# Patient Record
Sex: MALE | Race: WHITE | Employment: OTHER | URBAN - METROPOLITAN AREA
[De-identification: names, ages, dates, MRNs, and addresses within clinical notes are randomized per-mention and may not be internally consistent; named-entity substitution may affect disease eponyms.]

---

## 2019-09-21 ENCOUNTER — TRANSFERRED RECORDS (OUTPATIENT)
Dept: HEALTH INFORMATION MANAGEMENT | Facility: CLINIC | Age: 84
End: 2019-09-21

## 2019-10-28 ENCOUNTER — TRANSFERRED RECORDS (OUTPATIENT)
Dept: HEALTH INFORMATION MANAGEMENT | Facility: CLINIC | Age: 84
End: 2019-10-28

## 2019-11-04 ENCOUNTER — TRANSFERRED RECORDS (OUTPATIENT)
Dept: HEALTH INFORMATION MANAGEMENT | Facility: CLINIC | Age: 84
End: 2019-11-04

## 2019-11-14 ENCOUNTER — MEDICAL CORRESPONDENCE (OUTPATIENT)
Dept: HEALTH INFORMATION MANAGEMENT | Facility: CLINIC | Age: 84
End: 2019-11-14

## 2019-11-18 ENCOUNTER — TELEPHONE (OUTPATIENT)
Dept: NEUROLOGY | Facility: CLINIC | Age: 84
End: 2019-11-18

## 2019-11-18 NOTE — TELEPHONE ENCOUNTER
[facility] Call Center    Phone Message    May a detailed message be left on voicemail: yes    Reason for Call: Other: Pt calling in needs a new ALS appointment.      Action Taken: Message routed to:  [facility] neuro

## 2019-11-19 ENCOUNTER — TELEPHONE (OUTPATIENT)
Dept: NEUROLOGY | Facility: CLINIC | Age: 84
End: 2019-11-19

## 2019-11-19 NOTE — TELEPHONE ENCOUNTER
Left message for patient to call and register his insurance information, then call clinic nurse to schedule. Contact numbers provided.

## 2019-11-19 NOTE — TELEPHONE ENCOUNTER
[facility] Call [facility]    Phone Message    May a detailed message be left on voicemail: yes    Reason for Call: Other:      pt's PCP calling in to confirm  That the clinic has receved the faxed medical records.          Action Taken: Message routed to:  [facility] Neuro

## 2019-12-17 DIAGNOSIS — G12.21 ALS (AMYOTROPHIC LATERAL SCLEROSIS) (H): Primary | ICD-10-CM

## 2019-12-19 ENCOUNTER — APPOINTMENT (OUTPATIENT)
Dept: LAB | Facility: CLINIC | Age: 84
End: 2019-12-19
Payer: COMMERCIAL

## 2019-12-19 ENCOUNTER — OFFICE VISIT (OUTPATIENT)
Dept: NEUROLOGY | Facility: CLINIC | Age: 84
End: 2019-12-19
Payer: COMMERCIAL

## 2019-12-19 VITALS
DIASTOLIC BLOOD PRESSURE: 60 MMHG | OXYGEN SATURATION: 98 % | HEART RATE: 62 BPM | WEIGHT: 231.7 LBS | SYSTOLIC BLOOD PRESSURE: 92 MMHG

## 2019-12-19 DIAGNOSIS — R79.9 ABNORMAL FINDING OF BLOOD CHEMISTRY, UNSPECIFIED: ICD-10-CM

## 2019-12-19 DIAGNOSIS — G12.21 ALS (AMYOTROPHIC LATERAL SCLEROSIS) (H): ICD-10-CM

## 2019-12-19 DIAGNOSIS — G60.3 IDIOPATHIC PROGRESSIVE POLYNEUROPATHY: Primary | ICD-10-CM

## 2019-12-19 DIAGNOSIS — G60.3 IDIOPATHIC PROGRESSIVE POLYNEUROPATHY: ICD-10-CM

## 2019-12-19 DIAGNOSIS — G31.84 MILD COGNITIVE IMPAIRMENT, SO STATED: ICD-10-CM

## 2019-12-19 LAB
ERYTHROCYTE [SEDIMENTATION RATE] IN BLOOD BY WESTERGREN METHOD: 10 MM/H (ref 0–20)
HBA1C MFR BLD: 5.7 % (ref 0–5.6)
TSH SERPL DL<=0.005 MIU/L-ACNC: 1.51 MU/L (ref 0.4–4)
VIT B12 SERPL-MCNC: 1433 PG/ML (ref 193–986)

## 2019-12-19 RX ORDER — ALLOPURINOL 300 MG/1
300 TABLET ORAL DAILY
COMMUNITY
Start: 2013-04-15

## 2019-12-19 RX ORDER — LOSARTAN POTASSIUM 100 MG/1
100 TABLET ORAL DAILY
COMMUNITY
Start: 2012-01-27

## 2019-12-19 RX ORDER — ATORVASTATIN CALCIUM 40 MG/1
40 TABLET, FILM COATED ORAL DAILY
COMMUNITY
Start: 2019-11-14

## 2019-12-19 RX ORDER — COLCHICINE 0.6 MG/1
0.6 TABLET ORAL EVERY 24 HOURS
COMMUNITY
Start: 2013-04-15

## 2019-12-19 RX ORDER — LANOLIN ALCOHOL/MO/W.PET/CERES
1000 CREAM (GRAM) TOPICAL DAILY
COMMUNITY
Start: 2019-11-14

## 2019-12-19 RX ORDER — CALCITRIOL 0.25 UG/1
0.25 CAPSULE, LIQUID FILLED ORAL EVERY MORNING
COMMUNITY
Start: 2018-10-31

## 2019-12-19 RX ORDER — METOPROLOL SUCCINATE 25 MG/1
25 TABLET, EXTENDED RELEASE ORAL DAILY
COMMUNITY
Start: 2019-11-14

## 2019-12-19 RX ORDER — FUROSEMIDE 40 MG
40 TABLET ORAL 2 TIMES DAILY
COMMUNITY
Start: 2013-04-15

## 2019-12-19 SDOH — HEALTH STABILITY: MENTAL HEALTH: HOW OFTEN DO YOU HAVE A DRINK CONTAINING ALCOHOL?: NEVER

## 2019-12-19 ASSESSMENT — PAIN SCALES - GENERAL: PAINLEVEL: NO PAIN (0)

## 2019-12-19 NOTE — NURSING NOTE
Chief Complaint   Patient presents with     Consult     [facility] - CONSULTATION       Davian Dias, EMT

## 2019-12-19 NOTE — PATIENT INSTRUCTIONS
1. You have a neuropathy (disorder of the nerves). It is not dangerous but we will do some blood tests today to evaluate it and have you see a physical therapist as well.    2. Brain scan - Please call [phone] to schedule in our imaging department.    3. We will evaluate your memory with a functional test called a cognitive performance test.    4. I would like you to see a neurologist who can follow up on all of these issues. Please call [phone] to schedule an appointment with General Neurology.    5. I will speak with Dr Lawrence about today's findings, about prompt vitamin B12 supplementation, blood pressure, and sleep evaluation.    6. It is important that you take your medications consistently and as directed to be sure you are getting correct dosages. I recommend a weekly or monthly pill box organizer.    Please call Stephanie @ [phone] if you have questions.

## 2019-12-19 NOTE — LETTER
2019       RE: Henok Negron  [address]  [facility] [address]     Dear Colleague,    Thank you for referring your patient, Henok Negron, to the [facility] at [facility]. Please see a copy of my visit note below.    Service Date: 2019      MD Mikhail Neumann MD David Lin, MD      RE: Henok Negron   MRN: [id number]   : 1935      Dear Doctors:      I saw Henok Negron in neuromuscular consultation today at the [facility] where he has been referred for evaluation of possible ALS.  Mr. Negron is an 84-year-old man with left leg weakness.  He reports longstanding sequelae of a left knee injury from college football, for which he wore a knee brace for many years.  More recently he has felt that he has less confidence in the use of his left leg, which he describes as weakness at the knee, and his gait has been observed to have changed.  In the fall of this year, after a possible subacute worsening of this, he was hospitalized briefly at [facility].  A peroneal neuropathy was suspected, but the patient left the hospital before appropriate diagnostic studies could be completed.  He was subsequently referred for outpatient Neurology consultation and underwent electrodiagnostic studies which demonstrated evidence of a sensory motor neuropathy as well as widespread electrophysiologic evidence of fasciculations in the left lower limb.  Lumbar and cervical imaging did demonstrate significant spondylosis, but no compelling evidence of root compression.  He is referred for further evaluation of this problem.      Mr. Negron denies perceiving fasciculations or cramping.  He and his 2 children who accompanied him today feel that his leg function improved substantially after wearing an ankle-foot orthosis and that that improvement has been sustained even without the AFO.   He denies significant sphincter or sensory disturbances.  He denies upper extremity weakness or clumsiness.  He does endorse some memory difficulty which is strongly endorsed by his daughter, although neither he nor his children feel that there has been a substantial problem with self-care related to memory.  There is not a clear history of behavioral or mood disorder.  He denies dysarthria or dysphagia.  He has only occasional morning headaches and denies nonrestorative sleep, although his daughter indicates that he has been observed to have nocturnal apneas.      Mr. Negron has 2 living brothers and 2 children.  There is no family history of neuromuscular or neurodegenerative disease.  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  He lives alone, but in a senior living facility where he is very active, both socially and physically.      The patient's past medical history is also notable for hyperlipidemia, hypertension and atrial fibrillation.      MEDICATIONS:  Medications are documented in the electronic medical record.      EXAMINATION:  Vital signs are normal, although systolic blood pressure is only 92.  He did have a brief episode of lightheadedness after exiting the car today, but has had no other episodes of syncope or presyncope.  He reports that his systolic blood pressure is commonly in the 90s and a review of recent blood pressures does indicate a similar blood pressure not long ago in his primary care office and other readings in the low 100s.  He is not tachycardic or hypoxemic.  General physical examination demonstrates unremarkable extremities.  There is medically trivial bilateral peripheral edema.  He does have a history of chronic renal insufficiency as well.      NEUROLOGIC EXAMINATION:  The patient is alert and cooperative.  There is a relative paucity of spontaneous affect.  Blink frequency was not systematically evaluated but does not appear to be strikingly reduced.  There is  no evident bradykinesia or bradyphrenia.  He is oriented to place, month and year, but not to date.  Serial 7s are performed without difficulty.  He recalls 0/3 words after distraction and 1/3 with cues.  Speech and language functions are normal.      Neuromuscular examination is as follows:      Electrodiagnostic studies as noted demonstrate evidence of a sensorimotor axonal polyneuropathy with possible conduction slowing across the fibular head on the left.  The amplitudes are not easily read from a faxed copy so it is not clear whether there was a substantial amplitude decrement across the fibular head, although both proximally and distally the amplitudes appear to be quite low.  Fasciculation potentials were noted in lower limbs.  While there is some evidence of motor unit remodeling, fibrillation potentials are not noted in any muscles of the upper or lower limbs.      I explained the following to Mr. 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  Insofar as his strength is normal, his electrodiagnostic studies demonstrate no fibrillation potentials and he has no upper motor neuron signs I do not feel he has ALS.  I certainly cannot exclude the possibility that he will develop clear signs of ALS or related conditions in the future but do not think that is likely. His clinical findings and gait difficulty may reflect a combination of the polyneuropathy and perhaps transient peroneal neuropathy or radiculopathy.  I also think it is difficult to exclude the possibility of a central nervous system component to his lateralized gait abnormality and, recognizing this as well as his vascular risks and cognitive difficulty, I am obtaining an MRI of the brain.  I had intended an MRA as well but given his renal insufficiency, a  contrast-enhanced MRA may not be feasible.  A time of flight study could be done at a later date if deemed important to do so.     I am concerned about Mr. Askew's cognitive disturbance.  In addition to the aforementioned, he also perseverated several times today in conversation.  I have arranged a cognitive performance test.  I also had him seen by our physical therapist today who recommended followup therapy evaluations for gait and balance.  I am obtaining laboratory studies today relevant to polyneuropathy.      In addition, I see that his methylmalonic acid level performed recently and repeated his vitamin B12 level; it is leydi and represents oral supplementation. JESS was normal. Hemoglobin A1c was 5.7.    Finally, I would be interested in the opinion of his primary care provider and his cardiologist as to whether his blood pressure medication should be reduced somewhat.         I have suggested that Mr. Askew follow up with a general neurologist as there is no indication for him to be seen in the [facility].  Certainly, should his condition change or progress, I would be happy to see him at any time in the future.      Sincerely,       Jonatan García MD      cc:   Zoya Gill MD   [facility]      Mikhail Lawrence MD   Primary Care Physician      Jonatan Dowd MD   [facility]    [address]  [address]   [place] [address]       D: 2019   T: 2019   MT: AKA      Name:     KITTY ASKEW   MRN:      [id number]        Account:      [id number]   :      1935           Service Date: 2019      Document: [id number]

## 2019-12-20 LAB
IGA SERPL-MCNC: 231 MG/DL (ref 84–499)
IGG SERPL-MCNC: 978 MG/DL (ref 610–1616)
IGM SERPL-MCNC: 162 MG/DL (ref 35–242)
PROT PATTERN SERPL IFE-IMP: NORMAL

## 2019-12-23 ENCOUNTER — TELEPHONE (OUTPATIENT)
Dept: NEUROLOGY | Facility: CLINIC | Age: 84
End: 2019-12-23

## 2019-12-23 NOTE — PROGRESS NOTES
Service Date: 2019      MD Mikhail Neumann MD David Lin, MD      RE: Henok Negron   MRN: [id number]   : 1935      Dear Doctors:      I saw Henok Negron in neuromuscular consultation today at the [facility] where he has been referred for evaluation of possible ALS.  Mr. Negron is an 84-year-old man with left leg weakness.  He reports longstanding sequelae of a left knee injury from college football, for which he wore a knee brace for many years.  More recently he has felt that he has less confidence in the use of his left leg, which he describes as weakness at the knee, and his gait has been observed to have changed.  In the fall of this year, after a possible subacute worsening of this, he was hospitalized briefly at [facility].  A peroneal neuropathy was suspected, but the patient left the hospital before appropriate diagnostic studies could be completed.  He was subsequently referred for outpatient Neurology consultation and underwent electrodiagnostic studies which demonstrated evidence of a sensory motor neuropathy as well as widespread electrophysiologic evidence of fasciculations in the left lower limb.  Lumbar and cervical imaging did demonstrate significant spondylosis, but no compelling evidence of root compression.  He is referred for further evaluation of this problem.      Mr. Negron denies perceiving fasciculations or cramping.  He and his 2 children who accompanied him today feel that his leg function improved substantially after wearing an ankle-foot orthosis and that that improvement has been sustained even without the AFO.  He denies significant sphincter or sensory disturbances.  He denies upper extremity weakness or clumsiness.  He does endorse some memory difficulty which is strongly endorsed by his daughter, although neither he nor his children feel that there has been a substantial problem with  self-care related to memory.  There is not a clear history of behavioral or mood disorder.  He denies dysarthria or dysphagia.  He has only occasional morning headaches and denies nonrestorative sleep, although his daughter indicates that he has been observed to have nocturnal apneas.      Mr. Negron has 2 living brothers and 2 children.  There is no family history of neuromuscular or neurodegenerative disease.  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  He lives alone, but in a senior living facility where he is very active, both socially and physically.      The patient's past medical history is also notable for hyperlipidemia, hypertension and atrial fibrillation.      MEDICATIONS:  Medications are documented in the electronic medical record.      EXAMINATION:  Vital signs are normal, although systolic blood pressure is only 92.  He did have a brief episode of lightheadedness after exiting the car today, but has had no other episodes of syncope or presyncope.  He reports that his systolic blood pressure is commonly in the 90s and a review of recent blood pressures does indicate a similar blood pressure not long ago in his primary care office and other readings in the low 100s.  He is not tachycardic or hypoxemic.  General physical examination demonstrates unremarkable extremities.  There is medically trivial bilateral peripheral edema.  He does have a history of chronic renal insufficiency as well.      NEUROLOGIC EXAMINATION:  The patient is alert and cooperative.  There is a relative paucity of spontaneous affect.  Blink frequency was not systematically evaluated but does not appear to be strikingly reduced.  There is no evident bradykinesia or bradyphrenia.  He is oriented to place, month and year, but not to date.  Serial 7s are performed without difficulty.  He recalls 0/3 words after distraction and 1/3 with cues.  Speech and language functions are normal.      Neuromuscular examination is  as follows:      Electrodiagnostic studies as noted demonstrate evidence of a sensorimotor axonal polyneuropathy with possible conduction slowing across the fibular head on the left.  The amplitudes are not easily read from a faxed copy so it is not clear whether there was a substantial amplitude decrement across the fibular head, although both proximally and distally the amplitudes appear to be quite low.  Fasciculation potentials were noted in lower limbs.  While there is some evidence of motor unit remodeling, fibrillation potentials are not noted in any muscles of the upper or lower limbs.      I explained the following to Mr. 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  Insofar as his strength is normal, his electrodiagnostic studies demonstrate no fibrillation potentials and he has no upper motor neuron signs I do not feel he has ALS.  I certainly cannot exclude the possibility that he will develop clear signs of ALS or related conditions in the future but do not think that is likely. His clinical findings and gait difficulty may reflect a combination of the polyneuropathy and perhaps transient peroneal neuropathy or radiculopathy.  I also think it is difficult to exclude the possibility of a central nervous system component to his lateralized gait abnormality and, recognizing this as well as his vascular risks and cognitive difficulty, I am obtaining an MRI of the brain.  I had intended an MRA as well but given his renal insufficiency, a contrast-enhanced MRA may not be feasible.  A time of flight study could be done at a later date if deemed important to do so.     I am concerned about Mr. Negron's cognitive disturbance.  In addition to the aforementioned, he also perseverated several times today in conversation.  I have  arranged a cognitive performance test.  I also had him seen by our physical therapist today who recommended followup therapy evaluations for gait and balance.  I am obtaining laboratory studies today relevant to polyneuropathy.      In addition, I see that his methylmalonic acid level performed recently and repeated his vitamin B12 level; it is leydi and represents oral supplementation. JESS was normal. Hemoglobin A1c was 5.7.    Finally, I would be interested in the opinion of his primary care provider and his cardiologist as to whether his blood pressure medication should be reduced somewhat.         I have suggested that Mr. Askew follow up with a general neurologist as there is no indication for him to be seen in the [facility].  Certainly, should his condition change or progress, I would be happy to see him at any time in the future.      Sincerely,       Cherry Chirinos MD      cc:   Zoya Gill MD   [facility]      Mikhail Lawrence MD   Primary Care Physician      Cherry Dowd MD   [facility]    [address] [place] [id number]         CHERRY CHIRINOS MD             D: 2019   T: 2019   MT: AKA      Name:     KITTY ASKEW   MRN:      [id number]        Account:      [id number]   :      1935           Service Date: 2019      Document: [id number]

## 2019-12-24 LAB — METHYLMALONATE SERPL-SCNC: 0.35 UMOL/L (ref 0–0.4)

## 2019-12-26 DIAGNOSIS — G60.3 IDIOPATHIC PROGRESSIVE POLYNEUROPATHY: Primary | ICD-10-CM

## 2020-01-13 ENCOUNTER — HOSPITAL ENCOUNTER (OUTPATIENT)
Dept: OCCUPATIONAL THERAPY | Facility: CLINIC | Age: 85
Setting detail: THERAPIES SERIES
End: 2020-01-13
Attending: PSYCHIATRY & NEUROLOGY
Payer: COMMERCIAL

## 2020-01-13 DIAGNOSIS — G31.84 MILD COGNITIVE IMPAIRMENT, SO STATED: ICD-10-CM

## 2020-01-13 DIAGNOSIS — G60.3 IDIOPATHIC PROGRESSIVE POLYNEUROPATHY: ICD-10-CM

## 2020-01-13 PROCEDURE — 97165 OT EVAL LOW COMPLEX 30 MIN: CPT | Mod: GO | Performed by: REHABILITATION PRACTITIONER

## 2020-01-13 PROCEDURE — 96125 COGNITIVE TEST BY HC PRO: CPT | Mod: GO | Performed by: REHABILITATION PRACTITIONER

## 2020-01-13 PROCEDURE — 97535 SELF CARE MNGMENT TRAINING: CPT | Mod: GO | Performed by: REHABILITATION PRACTITIONER

## 2020-01-13 NOTE — PROGRESS NOTES
Cognitive Performance Test    SUMMARY OF TEST:    The Cognitive Performance Test (CPT) is a standardized performance-based assessment to measure working memory/executive function processing capacities that underlie functional performance. Subtasks include common basic and instrumental activities of daily living (ADL/IADL) which are rated based on the manner in which patients respond to task demands of varying complexity. The total CPT score describes a level of functioning that indicates how information is processed, implications for functional activities, potential safety risks and a recommended level of supervision or assist based on cognitive function. The highest total score on this test is in the range of 5.6 to 5.8.    DATE OF TESTIN2020    RESULTS OF TESTING:                                                                                         CPT Subtest Results    MEDBOX: 4.5/6 SHOP/GLOVES: 4./6 PHONE:    WASH:   TRAVEL:  TOAST:    DRESS:    TOTAL CPT SCORE:  30/34     Average CPT Score  5.0/5.6    INTERPRETATION OF TEST RESULTS:    Based on the Cognitive Performance Test, this patient scored at CPT Level 5.0.  See CPT Levels reference below.    Summary of functional cognitive status:   Braydon was cooperative during the session today. He reported that he thought today's appointment was to address his leg.  When he was informed that it was for the CPT, he denied any difficulty with recall.  During the test, Braydon was observed initiating the task before all of the instructions were provided, was mildly impulsive and didn't look at all items (belts) before making a decision.  He had difficulty with completing the med task and was able to get 50% accurate with specific cues.  Braydon does demonstrate good problem solving skills during pathfinding and phone task.  Wash task not scored as he used soap by sink vs soap provided in box.  At the end of the session  Braydon perseverated on the belts  "having 2 different price tags.  This upset him and informed therapist that he will remember this for next time.  Pt was assured that the intention was not to \"trick\" but to provide different visual cues.      Factors affecting performance:  New or complex medical issue    Recommendations:    Assist for ADL/IADL:  Managing NEW medications, complex meal planning, Cleaning   Supervision for ADL/IADL:  Laundry, Finances, Driving and Medication management  Supervision in living setting:  Weekly checks                                                       Other Recommendations:  Recommend Braydon utilize 2 pill boxes (am and pm).  Currently he is placing a weeks worth of medications in 1 bottle and identifying the pills by shape and color.  Pt was resistant to changing his process, however, his current process leaves room for several errors and he is likely not aware of the errors he is making.  Recommend bills be placed on auto pay, that he has oversight with finances and someone who is able to access electronic financial records to make sure he is not overpaying, underpaying or being take advantage of financially.  Recommend Braydon have a predictable routine, engage is social activities and continue to participate in exercises.  Pt was willing to return for further OT to establish memory compensation strategies.      TIME ADMINISTERING TEST: 47    TIME FOR INTERPRETATION AND PREPARATION OF REPORT: 12    TOTAL TIME: 59      CPT Levels Reference:    Patient's Average CPT Score:  5.0                                                                                                                                                  Individual scores range along a continuum as outlined below.  In addition to cognitive status, other factors may affect safety in a home environment.  Please refer to specific recommendations for this patient.    ___5.6-5.8  Normal functioning (absence of cognitive-functional disability).  Independent in " "managing personal affairs, monitors and directs own behavior.  Uses complex information to carry out daily activities with safety and accuracy.    Proficient with instrumental activities of daily living (IADL) and learning new activity.  Problems are anticipated, errors are avoided, and consequences of actions are considered.      _XX__5.0   Mild cognitive-functional disability; deficits in working memory and executive thought processes. Difficulty using complex information. Problems may be observed with recent memory, judgment, reasoning and planning ahead. May be impulsive or have difficulty anticipating consequences.  Safety:  May require assistance to plan ahead; or to manage complex medication schedules, appointments or finances.  Hazardous activities may need to be monitored or limited.  ADL:  Mild functional decline.  Able to complete basic self-care and routine household tasks.  May have difficulty with complex daily tasks such as reading, writing, meal preparation, shopping or driving.   Learns through hands on teaching. Self-centered behavior or difficulty considering the needs of others may be seen related to trouble seeing the  whole picture\". Can appear disorganized or uninhibited.    ___4.5  Mild to moderate cognitive-functional disability. Significant deficits in working memory and executive thought processes. Judgment, reasoning and planning show obvious impairment.  Distractible with inability to shift attention/actions given competing stimuli.  Difficulty with problem solving and managing details. Complex daily tasks performed with inconsistency, difficulty, or error.     Safety:  Medications should be monitored, stove use may require supervision, and driving ability may be affected.  Impaired safety awareness with inability to anticipate potential problems.  May not recognize or respond to emergent situations. Requires frequent check-in support.   ADL:  Mild difficulty with simple everyday " self-care tasks. Benefits from structured, routine activity.  Will likely need reminders to complete tasks outside of the routine. Requires assistance with planning and IADL tasks like shopping and finances. Learns concrete tasks through repetition, but performance may not generalize. Tends to be impulsive with poor insight. Self centered behavior or inability to consider the needs of others is common.    ___4.0  Moderate cognitive-functional disability; abstract to concrete thought processes. Working memory and executive function impairments are obvious. Difficulty with planning and problem solving.  Behavior is goal-directed, but unable to follow multi-step directions, is easily distracted, and may not recognize mistakes.  Inability to anticipate hazards or understand precautions.  Safety:  Recommend 24-hour supervision for safety. Supervision needed for medication management and for hazardous activities. May not be able to follow a restricted diet. Can get lost in unfamiliar surroundings. Generally, persons functioning at level 4 should not be driving.   ADL:  Some decline in quality or frequency of ADL.  [place] enhanced by use of a routine, simple concrete directions, and caregiver set-up of needed items. Complex tasks such as money or home management typically requires assistance.  Relies heavily on vision to guide behavior; will ignore objects/hazards not in plain sight and can be distracted by irrelevant objects. Often has poor insight.  Able to carry out social conversation and may verbally  cover  for deficits leading caregivers to believe they are capable of functioning independently.       ___3.5  Moderate cognitive-functional disability; increased cues needed for task completion. Aware of concrete task steps but needs prompting or cues to initiate and complete simple tasks. Attention span is limited, simple directions may need to be repeated, and re-focus to a topic or task may be  required.  Safety:  24-hour supervision required for safety and for assistance with daily tasks. Assistance required with medications, and access to medication should be limited. Meals, nutrition and dietary restrictions need to be monitored.  All hazardous activities should be restricted or supervised. Should not drive. Prone to wandering and can become lost.  ADL:  Moderate functional decline. Familiar tasks usually requires set-up of supplies and directions to complete steps. May need objects handed to them for task initiation. Function best with a set schedule in familiar surroundings with familiar people. All complex tasks must be done by others. Vocabulary is diminished and speech often unfocused.

## 2020-01-13 NOTE — PROGRESS NOTES
01/13/20 0900   Quick Adds   Type of Visit Initial Outpatient Occupational Therapy Evaluation   General Information   Start Of Care Date 01/13/20   Referring Physician Dr. Jonatan García   Orders Evaluate and treat as indicated   Other Orders PT   Orders Date 12/26/19   Medical Diagnosis Idiopathic progressive neuropathy   Onset of Illness/Injury or Date of Surgery 12/26/19   Precautions/Limitations No known precautions/limitations   Special Instructions CPT   Surgical/Medical History Reviewed Yes   Additional Occupational Profile Info/Pertinent History of Current Problem Pt was seen by Dr. García who noted difficulty with recall and daughter confirmed.  She reports pt has been having difficulty recalling conversations and asking the same question several times.  This concern resulted in the referral for CPT.  Pt is also being seen for PT for left leg weakness and balance impairments.  pt reports this is his primary concern.    Comments/Observations Daughter Susan present   Role/Living Environment   Current Community Support Family/friend caregiver   Patient role/Employment history Retired   Community/Avocational Activities Exercises 5x/week, pt reports he will attend social gatherings at [facility]   Current Living Environment Assisted living   Prior Level - Transfers Independent   Prior Level - Ambulation Independent   Prior Level - ADLS Independent   Prior Responsibilities - IADL Shopping;Meal Preparation;Medication management;Laundry;Finances;Driving   Role/Living Environment Comments Pt moved to [facility] for 3-4 years ago, pt exercises 45 minutes of exercise 5 days a week.  Pt has dinner prepared 1x/day, assist with cleaning.  [facility] does have transportation available.     Patient/family Goals Statement To address memory, improve walking   Pain   Patient currently in pain No   Fall Risk Screen   Fall screen completed by PT   Fall screen comments Pt has PT scheduled.  Pt does report  falling pushing a cart.     Cognitive Status Examination   Level of Consciousness Alert   Follows Commands and Answers Questions 75% of the time   Personal Safety and Judgment Impaired;At risk behaviors demonstrated   Memory Impaired   Memory Comments Pt demonstrates short term memory impairment, decreased insight into deficits and will benefit from further cognitive testing to improve I and safety at home.     Cognitive Comment Will complete CPT.  Pt is not concerned about his memory, daughter reports she is concerned.     Grooming   Grooming Comments Clothes appeared soiled.     Instrumental Activities of Daily Living Assessment   IADL Assessment/Observations Daughter reports oversight for finances   Planned Therapy Interventions   Planned Therapy Interventions ADL training;IADL training;Self care/Home management   Adult OT Eval Goals   OT Eval Goals (Adult) 1;2    OT Goal 1   Goal Identifier 1 CPT   Goal Description Pt and family will participate in CPT, verbalize understanding of results and recommendations for safety.     Target Date 02/24/20    OT Goal 2   Goal Identifier 2 Memory compensation   Goal Description Pt will identify2  memory compensation strategies to improve engagement in ADL's and recall for ADL tasks with mod I.     Target Date 02/24/20   Clinical Impression   Criteria for Skilled Therapeutic Interventions Met Yes, treatment indicated   OT Diagnosis Impaired ADL/IADL I   Influenced by the following impairments  impaired balance,    Assessment of Occupational Performance 1-3 Performance Deficits   Identified Performance Deficits Impaired recall for ADL's   Clinical Decision Making (Complexity) Low complexity   Therapy Frequency 1x/wee   Predicted Duration of Therapy Intervention (days/wks) 6 weeks   Risks and Benefits of Treatment have been explained. Yes   Patient, Family & other staff in agreement with plan of care Yes   Clinical Impression Comments pt will benefit from formalized CPT testing  to determine level of assist to improve I and safety at home.    Education Assessment   Barriers To Learning Cognitive   Preferred Learning Style Reading   Total Evaluation Time   OT Eval, Low Complexity Minutes (69753) 16

## 2020-01-16 ENCOUNTER — HOSPITAL ENCOUNTER (OUTPATIENT)
Dept: PHYSICAL THERAPY | Facility: CLINIC | Age: 85
Setting detail: THERAPIES SERIES
End: 2020-01-16
Attending: PSYCHIATRY & NEUROLOGY
Payer: COMMERCIAL

## 2020-01-16 PROCEDURE — 97110 THERAPEUTIC EXERCISES: CPT | Mod: GP | Performed by: PHYSICAL THERAPIST

## 2020-01-16 PROCEDURE — 97161 PT EVAL LOW COMPLEX 20 MIN: CPT | Mod: GP | Performed by: PHYSICAL THERAPIST

## 2020-01-16 PROCEDURE — 97112 NEUROMUSCULAR REEDUCATION: CPT | Mod: GP | Performed by: PHYSICAL THERAPIST

## 2020-01-16 NOTE — PROGRESS NOTES
[facility]        OUTPATIENT PHYSICAL THERAPY FUNCTIONAL EVALUATION  PLAN OF TREATMENT FOR OUTPATIENT REHABILITATION  (COMPLETE FOR INITIAL CLAIMS ONLY)  Patient's Last Name, First Name, M.I.  YOB: 1935  Henok Negron     Provider's Name   [facility]   Medical Record No.  [id number]     Start of Care Date:  01/16/20   Onset Date:  12/26/19(date of order, has had LLE weakness for many years)   Type:     _X__PT   ____OT  ____SLP Medical Diagnosis:  Idiopathic progressive polyneuropathy     PT Diagnosis:  L LE weakness and impaired gait Visits from SOC:  1                              __________________________________________________________________________________  Plan of Treatment/Functional Goals:  balance training, gait training, neuromuscular re-education, strengthening, stretching, motor coordination training           GOALS  HEP  Braydon will demonstrate independence with a HEP focusing on LE strengthening and balance/proprioception training in order to improve strength and gait independently  01/16/20  MET                      Therapy Frequency:  1 time/week   Predicted Duration of Therapy Intervention:  1 visit    Celina Watkins, PT                                    I CERTIFY THE NEED FOR THESE SERVICES FURNISHED UNDER        THIS PLAN OF TREATMENT AND WHILE UNDER MY CARE     (Physician co-signature of this document indicates review and certification of the therapy plan).                Certification Date From:  01/16/20   Certification Date To:  01/16/20    Referring Provider:  Dr. García    Initial Assessment  See Epic Evaluation- Start of Care Date: 01/16/20

## 2020-01-16 NOTE — PROGRESS NOTES
01/16/20 0800   Quick Adds   Quick Adds Certification   Type of Visit Initial OP PT Evaluation   General Information   Start of Care Date 01/16/20   Referring Physician Dr. García   Orders Evaluate and Treat as Indicated   Order Date 12/26/19   Medical Diagnosis Idiopathic progressive polyneuropathy   Onset of illness/injury or Date of Surgery 12/26/19  (date of order, has had LLE weakness for many years)   Surgical/Medical history reviewed Yes   Pertinent history of current problem (include personal factors and/or comorbidities that impact the POC) Braydon presents with a history of L LE weakness. 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 R leg also weaker now. Hospitalized in the fall at [facility] due to weakness. Following, underwent electrodiagnostic studies which demonstrated evidence of a sensory motor neuropathy as well as widespread electrophysiologic evidence of fasciculations in the left lower limb.  Lumbar and cervical imaging did demonstrate significant spondylosis, but no compelling evidence of root compression. Was worked up for ALS however was not diagnosed at this time. Braydon has a regular exercise routine at home. Expresses desire to add exercises that would be benefitial to improving LLE strength and walking.    Prior level of function comment Exercises about 4 days/week, tracks with log   Patient role/Employment history Retired   Living environment Apartment/condo   Home/Community Accessibility Comments [place]   Current Assistive Devices Four Wheeled Walker  (only uses at night when getting out of bed to use bathroom)   Patient/Family Goals Statement Have assessment, see if there are exercises he can incorporate   General Information Comments Pt arrives independently to session.   Fall Risk Screen   Fall screen completed by PT   Have you fallen 2 or more times in the past year? No   Have you fallen and had an injury in the past year? No   Pain   Patient currently in pain No   Cognitive Status  Examination   Cognitive Comment Reports confusion with this appointment. Is not sure why it was ordered. Also does not recall appointment with Dr. García in December. See OT.   Integumentary   Integumentary No deficits were identified   Range of Motion (ROM)   ROM Comment B LEs WFLs   Strength   Strength Comments B hip flexion 4+/5, abduction 4+/5, knee extension 5/5, R dorsiflexion 5/5, L dorsiflexion 4/5. Difficulty with single leg toe raise on LLE, limited ROM indicating weakness   Bed Mobility   Bed Mobility Comments Independent   Transfer Skills   Transfer Comments Independent without use of UEs   Gait   Gait Comments Ambulates with mild heel whip and circumduction to achieve L heel strike. Bradyon notes that it feels that the L foot lands heavier than the R.    Balance   Balance Comments Able to safely demonstrate safety with gait speed changes. Albe to perform modified tandem gait. Good righting reactions noted today.   Balance Special Tests   Balance Special Tests Sit to stand reps;Romberg   Balance Special Tests Romberg   Seconds 30 Seconds   Balance Special Tests Sit to Stand Reps in 30 Seconds   Reps in 30 seconds 11   Height 16   Sensory Examination   Sensory Perception Comments reports no changes noted   Planned Therapy Interventions   Planned Therapy Interventions balance training;gait training;neuromuscular re-education;strengthening;stretching;motor coordination training   Clinical Impression   Criteria for Skilled Therapeutic Interventions Met yes, treatment indicated   PT Diagnosis L LE weakness and impaired gait   Influenced by the following impairments weakness, mild instability   Functional limitations due to impairments changes in gait pattern, history of a fall   Clinical Presentation Stable/Uncomplicated   Clinical Decision Making (Complexity) Low complexity   Therapy Frequency 1 time/week   Predicted Duration of Therapy Intervention (days/wks) 1 visit   Risk & Benefits of therapy have been  explained Yes   Patient, Family & other staff in agreement with plan of care Yes   Clinical Impression Comments Braydon would benefit from adding exercises to current routine focuing on ankle strengthening and balance. The pt is in agreement for 1 time session to learn exercises. Understands that he can return in the future if balance or gait pattern worsen   GOALS   PT Eval Goals 1   Goal 1   Goal Identifier HEP   Goal Description Braydon will demonstrate independence with a HEP focusing on LE strengthening and balance/proprioception training in order to improve strength and gait independently   Target Date 01/16/20   Date Met 01/16/20   Total Evaluation Time   PT Eval, Low Complexity Minutes (67196) 20   Therapy Certification   Certification date from 01/16/20   Certification date to 01/16/20   Medical Diagnosis Idiopathic progressive polyneuropathy

## 2020-01-23 NOTE — PROGRESS NOTES
Outpatient Occupational Therapy Discharge Note     Patient: Henok Negron  : 1935    Beginning/End Dates of Reporting Period:  20 only    Referring Provider: Dr. Jonatan García    Therapy Diagnosis: Memory loss    Client Self Report:   Pt denies need for therapy and refuses to schedule further OT sessions.    Objective Measurements:     Objective Measure: CPT   Details: 5.0         Goals:     Goal Identifier 1 CPT   Goal Description Pt and family will participate in CPT, verbalize understanding of results and recommendations for safety.     Target Date 20   Date Met   20   Progress:     Goal Identifier 2 Memory compensation   Goal Description Pt will identify2  memory compensation strategies to improve engagement in ADL's and recall for ADL tasks with mod I.     Target Date 20   Date Met      Progress:     Goal Identifier 3 medication management   Goal Description Pt will complete medication management task with 4+ novel medications and 100% accuracy and use of 2 strategies for organization.   Target Date 20   Date Met      Progress:       Progress Toward Goals:   Progress limited due to Pt was only seen for 1 visit, CPT completed and further OT was advised.    Plan:  Discharge from therapy.    Discharge:    Reason for Discharge: Patient chooses to discontinue therapy.  Patient has failed to schedule further appointments.      Discharge Plan: Other services: Resume OT when ready to particiapte.

## 2020-01-31 NOTE — TELEPHONE ENCOUNTER
RECORDS RECEIVED FROM: Internal   Date of Appt: 3/3/20   NOTES (FOR ALL VISITS) STATUS DETAILS   OFFICE NOTE from referring provider Internal Dr García @ [facility]:  12/19/19   OFFICE NOTE from other specialist Received Dr Zoya Luong @ [facility]:  10/28/19    Dr Lawrence @ [facility]:  11/15/19  11/8/19  11/1/19  10/1/19  9/24/19   DISCHARGE SUMMARY from hospital Care Everywhere [facility]:  9/20/19-9/22/19   DISCHARGE REPORT from the ER N/A    OPERATIVE REPORT N/A    MEDICATION LIST Internal    IMAGING  (FOR ALL VISITS)     EMG Received [facility]:  11/4/19   EEG N/A    MRI (HEAD, NECK, SPINE) In process [facility]:  MRI Lumbar Spine 11/4/19  MRI Cervical Spine 11/4/19    [facility]:  MRI Lumbar Spine 9/21/19   LUMBAR PUNCTURE N/A    [place] Scan N/A    CT (HEAD, NECK, SPINE) N/A       Action 1/31/20 MV 12.40pm   Action Taken Imaging request faxed to [facility] for:  MRI Lumbar Spine 11/4/19  MRI Cervical Spine 11/4/19    Imaging request faxed to [facility] for:  MRI Lumbar Spine 9/21/19     -2/11/2020-Images received from [facility], brought to be uploaded to PACS-MR @ 220pm

## 2020-02-07 LAB
EXPTIME-PRE: 8.72 SEC
FEF2575-%PRED-PRE: 73 %
FEF2575-PRE: 1.45 L/SEC
FEF2575-PRED: 1.96 L/SEC
FEFMAX-%PRED-PRE: 93 %
FEFMAX-PRE: 6.54 L/SEC
FEFMAX-PRED: 6.98 L/SEC
FEV1-%PRED-PRE: 74 %
FEV1-PRE: 2.1 L
FEV1FEV6-PRE: 73 %
FEV1FEV6-PRED: 76 %
FEV1FVC-PRE: 73 %
FEV1FVC-PRED: 74 %
FIFMAX-PRE: 6.11 L/SEC
FVC-%PRED-PRE: 75 %
FVC-PRE: 2.88 L
FVC-PRED: 3.83 L
MEP-PRE: 165 CMH2O
MIP-PRE: -98 CMH2O
MVV-%PRED-PRE: 373 %
MVV-PRE: 420 L/MIN
MVV-PRED: 113 L/MIN

## 2020-03-03 ENCOUNTER — PRE VISIT (OUTPATIENT)
Dept: NEUROLOGY | Facility: CLINIC | Age: 85
End: 2020-03-03

## 2022-04-16 ENCOUNTER — HEALTH MAINTENANCE LETTER (OUTPATIENT)
Age: 87
End: 2022-04-16

## 2022-10-22 ENCOUNTER — HEALTH MAINTENANCE LETTER (OUTPATIENT)
Age: 87
End: 2022-10-22

## 2023-06-01 ENCOUNTER — HEALTH MAINTENANCE LETTER (OUTPATIENT)
Age: 88
End: 2023-06-01